# Patient Record
(demographics unavailable — no encounter records)

---

## 2025-05-29 NOTE — DISCUSSION/SUMMARY
[de-identified] : Ice p.r.n. Tylenol and Advil prn Avoid irritating activities Continue home exercises Follow-up prn   Impression: Status post work June 1, 2014 left knee Mild osteoarthritis/chondromalacia patella/tear lateral meniscus

## 2025-05-29 NOTE — HISTORY OF PRESENT ILLNESS
[Work related] : work related [Gradual] : gradual [0] : 0 [Rest] : rest [Injection therapy] : injection therapy [Stairs] : stairs [Retired] : Work status: retired [de-identified] : The patient has had continued pain left knee.  Mild pain standing and walking.  Pain with stairs and movie sign.  Doing home exercises.  He had Euflexxa injections in [place] which she completed January 17.  Some mild improvement. [] : Post Surgical Visit: no [FreeTextEntry1] : L Knee  [FreeTextEntry3] : 6/1/2014 [de-identified] : 7/30/2024 [de-identified] : Dr. Lopez  [de-identified] : 5/28/2024

## 2025-05-29 NOTE — IMAGING
[Left] : left knee [de-identified] : Normal gait  Left knee No swelling.  Mild quad atrophy Mild lateral facet tenderness.  Mild lateral joint line tenderness Passive range of motion 0 to 120 degrees Ligaments are stable Quad strength 5-/5  Left leg No swelling Calf is soft and nontender Posterior tibial pulse 2+   [FreeTextEntry9] : Reviewed and interpreted.  Left knee AP standing, lateral, sunrise views-mild tricompartmental degenerative changes

## 2025-05-29 NOTE — WORK
[Was the competent medical cause of the injury] : was the competent medical cause of the injury [Are consistent with the injury] : are consistent with the injury [Total (100%)] : total (100%) [Not working] : Not working [Left] : left [No ___________] : No: [unfilled] [___lbs] : Occasionally: [unfilled] lbs [] : Never [Sedentary Work] : sedentary work [Could this patient perform any work activities with or without restrictions? Explain below.] : Yes [FreeTextEntry7] : Left knee [FreeTextEntry8] : 0 degrees to 120 degrees [FreeTextEntry5] : 15% [Could this patient perform his/her at-injury work activities with restrictions? If yes, specify below.] : No [Has the patient had an injury/illness since the date of injury which impacts residual functional capacity?] : No [Would the patient benefit from vocational rehabilitation? If Yes, explain below.] :  No

## 2025-05-29 NOTE — DATA REVIEWED
[MRI] : MRI [Left] : left [Knee] : knee [I independently reviewed and interpreted images and report] : I independently reviewed and interpreted images and report [FreeTextEntry1] : MRI left knee done September 17, 2021 at [facility] was reviewed.  There are degenerative changes of the lateral compartment and patellofemoral joint.  There is complex tear of the anterior horn of the lateral meniscus